# Patient Record
(demographics unavailable — no encounter records)

---

## 2018-02-16 NOTE — CT
CT ABDOMEN AND PELVIS NONCONTRAST

2/16/18

 

INDICATION:

Right flank pain.

 

FINDINGS:  

Mild volume loss is seen at the lung bases. There is nonobstructing left nephrolithiasis. Urinary bret
dder is decompressed. Left adnexal cyst formation is seen. No free air or obvious ascites. Solid abdo
william organs, bowel, lymph nodes and vasculature are limited in assessment by noncontrast technique. 
Imaged osseous structures are nonacute. There is mild colonic diverticulosis.

 

IMPRESSION:  

Nonobstructing left nephrolithiasis.

 

Evaluation otherwise limited on the basis of noncontrast technique. 

 

POS: [facility]